# Patient Record
Sex: FEMALE | Race: WHITE
[De-identification: names, ages, dates, MRNs, and addresses within clinical notes are randomized per-mention and may not be internally consistent; named-entity substitution may affect disease eponyms.]

---

## 2020-11-28 ENCOUNTER — HOSPITAL ENCOUNTER (EMERGENCY)
Dept: HOSPITAL 62 - ER | Age: 20
LOS: 1 days | Discharge: HOME | End: 2020-11-29
Payer: COMMERCIAL

## 2020-11-28 DIAGNOSIS — R40.4: ICD-10-CM

## 2020-11-28 DIAGNOSIS — Z34.91: Primary | ICD-10-CM

## 2020-11-28 DIAGNOSIS — F17.200: ICD-10-CM

## 2020-11-28 PROCEDURE — 80307 DRUG TEST PRSMV CHEM ANLYZR: CPT

## 2020-11-28 PROCEDURE — 36415 COLL VENOUS BLD VENIPUNCTURE: CPT

## 2020-11-28 PROCEDURE — 99284 EMERGENCY DEPT VISIT MOD MDM: CPT

## 2020-11-28 PROCEDURE — 81001 URINALYSIS AUTO W/SCOPE: CPT

## 2020-11-28 PROCEDURE — 70450 CT HEAD/BRAIN W/O DYE: CPT

## 2020-11-28 PROCEDURE — 83735 ASSAY OF MAGNESIUM: CPT

## 2020-11-28 PROCEDURE — 84703 CHORIONIC GONADOTROPIN ASSAY: CPT

## 2020-11-28 PROCEDURE — 80053 COMPREHEN METABOLIC PANEL: CPT

## 2020-11-28 PROCEDURE — 85025 COMPLETE CBC W/AUTO DIFF WBC: CPT

## 2020-11-28 NOTE — ER DOCUMENT REPORT
ED Medical Screen (RME)





- General


Chief Complaint: Seizure


Stated Complaint: SEIZURE,HEAD INJURY, LOST VOICE


Time Seen by Provider: 11/28/20 21:17


Mode of Arrival: Ambulatory


Information source: Patient


Notes: 





20-year-old female presented to ED for complaint of having a seizure at work 

tonight.  She states she has had 1 seizure in the past.  She states that she 

went on her break she started to smoke a cigarette and decided she needed to go 

urinate so she went to the bathroom then went back up to finish her cigarette.  

She states while she was outside having her smoke she had a seizure and passed 

out and woke up laying on the ground after she had hit her head.  She states 

when she woke up she sat up against a wall and then got up went back in and 

clocked in she said from the time she left on her right to the time she clocked 

back in was 5 minutes.  States she was totally alert and oriented when she went 

back in to work.

















I have greeted and performed a rapid initial assessment of this patient.  A 

comprehensive ED assessment and evaluation of the patient, analysis of test 

results and completion of medical decision making process will be conducted by 

an additional ED providers.





Physical Exam





- Vital signs


Vitals: 





                                        











Temp Pulse Resp BP Pulse Ox


 


 98.4 F   76   16   136/78 H  99 


 


 11/28/20 20:56  11/28/20 20:56  11/28/20 20:56  11/28/20 20:56  11/28/20 20:56














Course





- Vital Signs


Vital signs: 





                                        











Temp Pulse Resp BP Pulse Ox


 


 98.4 F   76   16   136/78 H  99 


 


 11/28/20 20:56  11/28/20 20:56  11/28/20 20:56  11/28/20 20:56  11/28/20 20:56

## 2020-11-29 VITALS — DIASTOLIC BLOOD PRESSURE: 66 MMHG | SYSTOLIC BLOOD PRESSURE: 106 MMHG

## 2020-11-29 LAB
ADD MANUAL DIFF: NO
ALBUMIN SERPL-MCNC: 5 G/DL (ref 3.5–5)
ALP SERPL-CCNC: 58 U/L (ref 38–126)
ANION GAP SERPL CALC-SCNC: 10 MMOL/L (ref 5–19)
APPEARANCE UR: (no result)
APTT PPP: YELLOW S
AST SERPL-CCNC: 24 U/L (ref 14–36)
BARBITURATES UR QL SCN: NEGATIVE
BASOPHILS # BLD AUTO: 0.2 10^3/UL (ref 0–0.2)
BASOPHILS NFR BLD AUTO: 1.4 % (ref 0–2)
BILIRUB DIRECT SERPL-MCNC: 0.2 MG/DL (ref 0–0.4)
BILIRUB SERPL-MCNC: 0.6 MG/DL (ref 0.2–1.3)
BILIRUB UR QL STRIP: NEGATIVE
BUN SERPL-MCNC: 6 MG/DL (ref 7–20)
CALCIUM: 10.3 MG/DL (ref 8.4–10.2)
CHLORIDE SERPL-SCNC: 105 MMOL/L (ref 98–107)
CO2 SERPL-SCNC: 25 MMOL/L (ref 22–30)
EOSINOPHIL # BLD AUTO: 0.4 10^3/UL (ref 0–0.6)
EOSINOPHIL NFR BLD AUTO: 2.8 % (ref 0–6)
ERYTHROCYTE [DISTWIDTH] IN BLOOD BY AUTOMATED COUNT: 15.1 % (ref 11.5–14)
ETHANOL SERPL-MCNC: < 10 MG/DL
GLUCOSE SERPL-MCNC: 88 MG/DL (ref 75–110)
GLUCOSE UR STRIP-MCNC: NEGATIVE MG/DL
HCT VFR BLD CALC: 41.7 % (ref 36–47)
HGB BLD-MCNC: 14.5 G/DL (ref 12–15.5)
KETONES UR STRIP-MCNC: 20 MG/DL
LYMPHOCYTES # BLD AUTO: 5.2 10^3/UL (ref 0.5–4.7)
LYMPHOCYTES NFR BLD AUTO: 34.1 % (ref 13–45)
MCH RBC QN AUTO: 31.1 PG (ref 27–33.4)
MCHC RBC AUTO-ENTMCNC: 34.7 G/DL (ref 32–36)
MCV RBC AUTO: 90 FL (ref 80–97)
METHADONE UR QL SCN: NEGATIVE
MONOCYTES # BLD AUTO: 0.6 10^3/UL (ref 0.1–1.4)
MONOCYTES NFR BLD AUTO: 4.2 % (ref 3–13)
NEUTROPHILS # BLD AUTO: 8.7 10^3/UL (ref 1.7–8.2)
NEUTS SEG NFR BLD AUTO: 57.5 % (ref 42–78)
NITRITE UR QL STRIP: NEGATIVE
PCP UR QL SCN: NEGATIVE
PH UR STRIP: 7 [PH] (ref 5–9)
PLATELET # BLD: 240 10^3/UL (ref 150–450)
POTASSIUM SERPL-SCNC: 3.7 MMOL/L (ref 3.6–5)
PROT SERPL-MCNC: 8.6 G/DL (ref 6.3–8.2)
PROT UR STRIP-MCNC: NEGATIVE MG/DL
RBC # BLD AUTO: 4.65 10^6/UL (ref 3.72–5.28)
SP GR UR STRIP: 1.01
TOTAL CELLS COUNTED % (AUTO): 100 %
URINE AMPHETAMINES SCREEN: NEGATIVE
URINE BENZODIAZEPINES SCREEN: NEGATIVE
URINE COCAINE SCREEN: NEGATIVE
URINE MARIJUANA (THC) SCREEN: NEGATIVE
UROBILINOGEN UR-MCNC: NEGATIVE MG/DL (ref ?–2)
WBC # BLD AUTO: 15.2 10^3/UL (ref 4–10.5)

## 2020-11-29 NOTE — ER DOCUMENT REPORT
ED General





- General


Chief Complaint: Probable Seizure


Stated Complaint: POSSIBLE SEIZURE


Time Seen by Provider: 20 21:17


Primary Care Provider: 


BHARAT LUNDBERG MD [NO LOCAL MD] - Follow up as needed


Mode of Arrival: Ambulatory


Information source: Patient


Notes: 





This 20-year-old female presents to the emergency department with a history of 

unresponsiveness episode which she thinks may represent a seizure episode.  

States that she was at work and took a break, while on break, she had an episode

where she came to after a period of unresponsiveness.  The episode was not 

witnessed by anyone.  And there was no incontinence of urine or bowel.  She does

have an area on the right forehead which may represent a bruise with some mild 

swelling.  Patient has no recollection of a fall or associated injury.  She has 

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 

had a episode where she passed out and thinks that she may have had a seizure at

that time as well.  The episode was not witnessed.  She states her mother has a 

history of seizures.  She is taking birth control pills and no other 

medications.





- Related Data


Allergies/Adverse Reactions: 


                                        





No Known Allergies Allergy (Verified 20 23:37)


   











Past Medical History





- General


Information source: Patient





- Social History


Smoking Status: Current Every Day Smoker


Chew tobacco use (# tins/day): No


Frequency of alcohol use: Occasional


Drug Abuse: None


Family History: Reviewed & Not Pertinent





Review of Systems





- Review of Systems


Notes: 





Constitutional: Negative for fever.


HENT: Negative for sore throat.


Eyes: Negative for visual changes.


Cardiovascular: Negative for chest pain.


Respiratory: Negative for shortness of breath.


Gastrointestinal: Negative for abdominal pain, vomiting or diarrhea.


Genitourinary: Negative for dysuria.


Musculoskeletal: Negative for back pain.


Skin: Negative for rash.


Neurological: See HPI





10 point ROS negative except as marked above and in HPI.





Physical Exam





- Vital signs


Vitals: 


                                        











Temp Pulse Resp BP Pulse Ox


 


 98.4 F   76   16   136/78 H  99 


 


 20 20:56  20 20:56  20 20:56  20 20:56  20 20:56














- Notes


Notes: 





PHYSICAL EXAMINATION:


 


Physical Exam:


General: Well-nourished well-developed 20-year-old female in no acute distress


HEENT: Right forehead with a small area approximately 3 cm size area of soft 

tissue swelling., pupils equal round and reactive to light, MM moist,nares 

clear, oropharynx clear, airway patent


Neck: supple, no adenopathy, no masses.  Good range of motion


Lungs: clear, no wheezing, no rales no rhonchi


CVS: Regular rate and rhythm no murmur gallop or rub


Abdomen: Soft, active, nontender, no masses, no hepatosplenomegaly


Ext:   No edema, clubbing or cyanosis.


Neuro: Alert and responsive, moving all 4 extremities on command, cranial nerves

intact, no focal findings


Skin: Intact no open lesions, no rash








Course





- Re-evaluation


Re-evalutation: 





20 00:38


Patient presents with concern she is having seizure episodes which occurred 

while she is at work.  There have been 2 separate occasions where she has known 

after having had some form of syncope.  She denies prior history of similar 

episodes in any other environmental setting.


20 01:38


I have informed the patient that she has a positive serum qualitative pregnancy 

test.  She is encouraged to begin prenatal vitamins and follow-up with OB/GYN.  

Acknowledges understanding states that this is her third pregnancy and she will 

follow-up.





- Vital Signs


Vital signs: 


                                        











Temp Pulse Resp BP Pulse Ox


 


 98.5 F   65   14   106/66   98 


 


 20 01:42  20 01:42  20 01:42  20 01:42  20 01:42














- Laboratory


Result Diagrams: 


                                 20 00:08





                                 20 00:08


Laboratory results interpreted by me: 


                                        











  20





  00:08 00:08 00:08


 


WBC  15.2 H  


 


RDW  15.1 H  


 


Absolute Neuts (auto)  8.7 H  


 


Absolute Lymphs (auto)  5.2 H  


 


BUN   6 L 


 


Calcium   10.3 H 


 


Total Protein   8.6 H 


 


Serum HCG, Qual    POSITIVE H


 


Urine Ketones   


 


Ur Leukocyte Esterase   














  20





  00:08


 


WBC 


 


RDW 


 


Absolute Neuts (auto) 


 


Absolute Lymphs (auto) 


 


BUN 


 


Calcium 


 


Total Protein 


 


Serum HCG, Qual 


 


Urine Ketones  20 H


 


Ur Leukocyte Esterase  TRACE H














- Diagnostic Test


Radiology reviewed: Image reviewed, Reports reviewed


Radiology results interpreted by me: 





20 01:38


CT head noncontrast: No acute intracranial findings.





Discharge





- Discharge


Clinical Impression: 


 Unresponsive episode, Incidental pregnancy





Condition: Good


Disposition: HOME, SELF-CARE


Instructions:  Syncopal Episode ([facility])


Additional Instructions: 


You were seen in emergency department tonight with episode which may have 

represented a seizure episode.  Recommend follow-up with your neurologist for 

further testing and decision with regards to possible treatment.  The serum 

pregnancy test which was performed in the emergency department came back 

positive.  Please follow-up with your OB/GYN for pregnancy management and 

treatment.  Please begin prenatal vitamins as soon as possible.








HOME CARE INSTRUCTIONS & INFORMATION:  Thank you for choosing us for your 

medical needs. We hope you're satisfied with the care you received.  After you 

leave, you must properly care for your problem and, at the same time, observe 

its progress.  Any condition can change.  Some illnesses can change rapidly over

hours or days.  If your condition worsens, return to the Emergency Department or

see your physician promptly.





ABOUT YOUR X-RAYS AND EKG'S:   If you had an EKG or X-rays taken, they have been

read by the Emergency Physician. The X-rays and EKG's will also be read by a 

Radiologist or Cardiologist within 24 hours.  If discrepancies are noted, you 

will be notified by telephone.  Please be certain the ED has a correct telephone

number & address where you can be reached.  Also, realize that some fractures or

abnormalities do not show up on initial X-rays.  If your symptoms continue, see 

your physician.





ABOUT YOUR LABORATORY TEST:   If you had laboratory tests, the results have been

reviewed by the Emergency Physician.  Some test results (for example cultures) 

may not be available for several days.  You will be contacted if any test result

shows you need additional treatment.  Please be certain the ED has a correct 

telephone number and address where you can be reached.





ABOUT YOUR MEDICATIONS:  You will receive instructions on how to take your 

medicine on the prescription label you receive.  Additional information may be 

provided by the Pharmacy.  If you have questions afterwards, call the ED for 

clarification or further instructions.  Some prescribed medications may cause 

drowsiness.  Do not perform tasks such as driving a car or operating machinery 

without consulting your Pharmacist.  If you feel you need a refill of pain 

medication, your condition will need re-evaluation.  Please do not call for a 

refill of any medication.





ABOUT YOUR SIGNATURE:   Signature of this document acknowledges to followin. Understanding that you received emergency treatment and that you may be 

released before al medical problems are known or treated. Please be certain   

the ED has a correct phone number & address where you can be reached.


   2. Acknowledgement that you will arrange for follow-up care as recommended.


   3. Authorization for the Emergency Physician to provide information to your 

follow-up Physician in order to maximize your care.





AT ANY TIME, IF YOUR SYMPTOMS CHANGE SIGNIFICANTLY OR WORSEN OR YOU DEVELOP NEW 

SYMPTOMS, RETURN TO THE EMERGENCY DEPARTMENT IMMEDIATELY FOR RE-EVALUATION.





OUR GOAL IS TO PROVIDE EXCELLENT MEDICAL CARE!





WE HOPE THAT WE HAVE MET YOUR EXPECTATIONS DURING YOUR EMERGENCY DEPARTMENT 

VISIT AND THAT YOU FEEL YOU HAVE RECEIVED EXCELLENT CARE!











Referrals: 


BHARAT LUNDBERG MD [NO LOCAL MD] - Follow up as needed

## 2020-11-29 NOTE — RADIOLOGY REPORT (SQ)
EXAM DESCRIPTION: 



CT HEAD WITHOUT IV CONTRAST



COMPLETED DATE/TME:  11/29/2020 01:04



CLINICAL HISTORY: possible seizure



COMPARISON: None available



TECHNIQUE: Axial CT of the head obtained from the skull apex to

the skull base without contrast.



FINDINGS: 

No acute intracranial hemorrhage identified. No mass, mass

effect, shift of the midline, abnormal extra-axial fluid

collection or CT evidence of acute ischemic change identified.

The ventricular system is unremarkable.  No acute abnormalities

of the supratentorial white matter, basal ganglia, cerebellum, or

brainstem.



The visualized paranasal sinuses and the mastoid air cells are

relatively well aerated.  No skull fracture identified. 

Visualized orbits and globes are unremarkable.







IMPRESSION:



1.  No acute intracranial abnormality identified.



This exam was performed according to our departmental

dose-optimization program, which includes automated exposure

control, adjustment of the mA and/or kV according to patient size

and/or use of iterative reconstruction technique.